# Patient Record
(demographics unavailable — no encounter records)

---

## 2025-02-12 NOTE — HISTORY OF PRESENT ILLNESS
[de-identified] : 57yF RHD referred from ER for L drfx.  Fell while in son's room off step stool, no hx of frx, no DEXA, no n/p.   In splint, 4/10 pain.  no n/p

## 2025-02-12 NOTE — PHYSICAL EXAM
[de-identified] : wrist Skin intact Tender at distal radius/ulna Nontender at hand or snuffbox full painless elbow rom DRUJ stable to shuck/= to c/l side painful wrist rom Nl cascade Able to form fist 5/5 epl fdp io SILT amur 2+ rad bcr (-) Phalen, Durkan test  [de-identified] : The following radiographs were ordered and read by me during this patient's visit. I reviewed each radiograph in detail with the patient and discussed the findings as highlighted below.   3 views of the L wrist were obtained today that show reduced drfx in much improved alignment. There are no degenerative changes seen. There is no malalignment. No obvious osseous abnormality. Otherwise unremarkable.

## 2025-02-12 NOTE — DISCUSSION/SUMMARY
[de-identified] : 57yF pw displaced L distal radius frx.  The risks, benefits and alternatives to closed treatment were discussed.  A lidocaine hematoma block was administered and a closed reduction was performed.  A molded long arm splint was applied.  Xr demonstrated excellent alignment.   Discussed likely ORIF if displaces.   The risks, benefits and alternatives to surgery were discussed.   The patient understands the risks include but are not limited to bleeding, infection, wound healing issues, damage to surrounding structures including nerves and arteries, revision surgery, symptomatic hardware, and the inability of the surgery to reduce the pain.  No guarantees were given regarding the surgery.  They understand there is a risk of loss of limb, extremity and life.  We discussed the proposed rehabilitation timeline as well as expected postoperative restrictions. The patient voiced a good understanding of treatment options, risks and benefits, postoperative instructions, rehabilitation timeline, and restrictions. The patient was given the opportunity to ask questions, which were all answered to the best of my ability and to their satisfaction. The patient will work with my office to arrange a time for surgery and obtain any medical clearance information necessary. The patient expressed understanding of his diagnosis and treatment plan and all questions were answered.  Will proceed w conservative tx at this time - nwb in splint, f/u weekly - new wrist xr  The patient was extensively counseled on treatment options including but not limited to observation, rest/activity modification, bracing, anti-inflammatory medications, physical therapy, injections, and surgery.  The natural history of the disease was thoroughly explained. \   I have personally obtained the history, reviewed the ROS as noted, and performed the physical examination today.  The patient and I discussed the assessment and options and developed the plan.  All questions were answered and the patient stated their understanding of the treatment plan and appreciation of the visit.   My cumulative time spent on this patient's visit included: Preparation for the visit, review of the medical records, review of pertinent diagnostic studies, examination and counseling of the patient on the above diagnosis, treatment plan and prognosis, orders of diagnostic tests, medications and/or appropriate procedures and documentation in the medical records of today's visit.   Miki Denton MD 
No